# Patient Record
Sex: MALE | Race: WHITE | NOT HISPANIC OR LATINO | Employment: OTHER | URBAN - METROPOLITAN AREA
[De-identification: names, ages, dates, MRNs, and addresses within clinical notes are randomized per-mention and may not be internally consistent; named-entity substitution may affect disease eponyms.]

---

## 2022-03-31 ENCOUNTER — OFFICE VISIT (OUTPATIENT)
Dept: FAMILY MEDICINE CLINIC | Facility: CLINIC | Age: 22
End: 2022-03-31

## 2022-03-31 VITALS
WEIGHT: 128 LBS | OXYGEN SATURATION: 99 % | HEIGHT: 69 IN | RESPIRATION RATE: 18 BRPM | BODY MASS INDEX: 18.96 KG/M2 | HEART RATE: 85 BPM | SYSTOLIC BLOOD PRESSURE: 108 MMHG | DIASTOLIC BLOOD PRESSURE: 70 MMHG

## 2022-03-31 DIAGNOSIS — IMO0001 HORMONAL IMBALANCE IN TRANSGENDER PATIENT: ICD-10-CM

## 2022-03-31 DIAGNOSIS — IMO0001 HORMONAL IMBALANCE IN TRANSGENDER PATIENT: Primary | ICD-10-CM

## 2022-03-31 DIAGNOSIS — F64.0 GENDER DYSPHORIA IN ADULT: ICD-10-CM

## 2022-03-31 PROCEDURE — 99203 OFFICE O/P NEW LOW 30 MIN: CPT | Performed by: FAMILY MEDICINE

## 2022-03-31 RX ORDER — ESTRADIOL VALERATE 20 MG/ML
4 INJECTION INTRAMUSCULAR 2 TIMES WEEKLY
Qty: 5 ML | Refills: 3 | Status: SHIPPED | OUTPATIENT
Start: 2022-03-31 | End: 2022-04-01 | Stop reason: SDUPTHER

## 2022-03-31 RX ORDER — ESTRADIOL VALERATE 20 MG/ML
4 INJECTION INTRAMUSCULAR 2 TIMES WEEKLY
Qty: 5 ML | Refills: 3 | Status: SHIPPED | OUTPATIENT
Start: 2022-03-31 | End: 2022-03-31 | Stop reason: SDUPTHER

## 2022-03-31 NOTE — PATIENT INSTRUCTIONS
1-800-QUIT-NOW    Transfemme Resources    [facility] Transgender Support Group - Facebook group    [employer] for Transgender Health, Standards of Care  https://www.wpath.org/publications/soc - pages 38 and 40 especially     [facility], Transgender Health - [email or web address]/guidelines      [facility] - [email or web address]    [facility] - [email or web address]     Parents, Families, and Friends of Les[place]ians and [place] - https://www.pflag.org/ourtranslovedones  - good document for friends/family who need to learn the basics     [facility]gender [place] - https://transequality.org/documents  - helps with name change, gender marker change, etc, is state specific     * * * * * *    Insurance will typically cover hormones, but if they don't, or if you need to pay out of pocket, use the following site.    www.Trist     1cc, Luer-Marichuy Tip  Syringes      18 G x 1.5” hypodermic needles for drawing up      27 G x 1/2” hypodermic needles for injecting      [facility] Injection Guide -   https://KidStart.org/wp-content/uploads/MG-6_TransHealth_InjectionGuide.pdf     Injection supplies:  Syringes   Needles   Alcohol swabs  Cotton balls/band-aids  Sharps container

## 2022-03-31 NOTE — PROGRESS NOTES
"Chief Complaint  Establish Care ([facility])    Subjective    History of Present Illness {CC  Problem List  Visit  Diagnosis   Encounters  Notes  Medications  Labs  Result Review Imaging  Media :23}     Andrews Rico presents to [facility] for [facility] ([facility]).  History of Present Illness     Here today to establish care and discuss starting gender-affirming hormone therapy. Has always had some awareness that he wasn't truly himself and didn't belong somehow. Once he graduated from high school and was able to \"explore the world a bit\", began to question his identity more. Experienced woodard bars, drag, etc, and found many of those experiences to be freeing and quite educational. Began to realize that he identifies as female and has been doing a lot of self-introspection towards deciding to start hormones. Is now self-sufficient and able to afford a transition, and is stable in his relationships and would like to start. Has been committed to this final end goal for about 5 years now and is very excited to start hormones. Tired of feeling like he's constantly acting as a person others want him to be.     Has done a lot of research into what's involved in transition. Feels that he has a very good idea of what he is getting into. Does not anticipate any difficulties with self administration.    Objective     Vital Signs:   /70   Pulse 85   Resp 18   Ht 175.3 cm (69\")   Wt 58.1 kg (128 lb)   SpO2 99%   BMI 18.90 kg/m²   Physical Exam  Vitals and nursing note reviewed.   Constitutional:       General: He is not in acute distress.     Appearance: Normal appearance. He is not ill-appearing.   Cardiovascular:      Rate and Rhythm: Normal rate and regular rhythm.      Pulses: Normal pulses.      Heart sounds: Normal heart sounds. No murmur heard.  Pulmonary:      Effort: Pulmonary effort is normal. No respiratory distress.      Breath sounds: Normal breath sounds. No rales. "   Neurological:      Mental Status: He is alert and oriented to person, place, and time. Mental status is at baseline.   Psychiatric:         Mood and Affect: Mood normal.         Behavior: Behavior normal.          Result Review  Data Reviewed:{ Labs  Result Review  Imaging  Med Tab  Media :23}                   Assessment and Plan {CC Problem List  Visit Diagnosis  ROS  Review (Popup)  Health Maintenance  Quality  BestPractice  Medications  SmartSets  SnapShot Encounters  Media :23}   Diagnoses and all orders for this visit:    1. Hormonal imbalance in transgender patient (Primary)  -     estradiol valerate (DELESTROGEN) 20 MG/ML injection; Inject 0.2 mL into the appropriate muscle as directed by prescriber 2 (Two) Times a Week.  Dispense: 5 mL; Refill: 3    2. Gender dysphoria in adult  -     estradiol valerate (DELESTROGEN) 20 MG/ML injection; Inject 0.2 mL into the appropriate muscle as directed by prescriber 2 (Two) Times a Week.  Dispense: 5 mL; Refill: 3    Established gender dysphoria, hormonal imbalance in a transgender patient with a strong desire for gender affirming hormone therapy.     Gender identity is consistent, persistent, and insistent. Discussed risks, benefits, alternatives, potential side effects of therapy, and typical follow up. Resources provided including [facility] Standards of Care, PFLAG “Our Trans Loved Ones”, and local support groups.     Meds as above. Gave detailed instructions as to proper subcutaneous injection technique and demonstrated the same. Given a bag of sample injection supplies to last until they are able to obtain their own supply. F/u with a phone call/message in 2-3 weeks, and an office visit in 2 months. Will order labs at f/u. Encouraged to sign up for and use [employer].    >50% of this 30 min visit spent in counseling about gender-affirming hormone therapy.      Follow Up {Instructions Charge Capture  Follow-up Communications :23}     Patient was given  instructions and counseling regarding his condition or for health maintenance advice. Please see specific information pulled into the AVS (placed there by myself) if appropriate.    Return in about 3 months (around 6/30/2022), or if symptoms worsen or fail to improve, for F/u gender-affirming hormone therapy.      AROLDO Zuñiga MD

## 2022-04-01 DIAGNOSIS — E34.9 HORMONAL DISORDER: ICD-10-CM

## 2022-04-01 DIAGNOSIS — F64.0 GENDER DYSPHORIA IN ADULT: ICD-10-CM

## 2022-04-01 RX ORDER — ESTRADIOL VALERATE 20 MG/ML
4 INJECTION INTRAMUSCULAR 2 TIMES WEEKLY
Qty: 5 ML | Refills: 3 | Status: SHIPPED | OUTPATIENT
Start: 2022-04-04

## 2022-04-01 NOTE — TELEPHONE ENCOUNTER
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 Prescription yesterday was sent to the wrong pharmacy. Please refill to updated pharmacy.

## 2022-04-02 ENCOUNTER — NURSE TRIAGE (OUTPATIENT)
Dept: CALL CENTER | Facility: HOSPITAL | Age: 22
End: 2022-04-02

## 2022-04-03 NOTE — TELEPHONE ENCOUNTER
"Caller states mild to moderate itching back of legs only. Caller states started this afternoon. Caller denies any wheezing, hives or facial swelling. Caller denies any rash present. Caller denies any breathing difficulty or difficulty swallowing. Caller states was started on estrogen recently. Caller advised per guideline and will contact PCP on Monday morning if still having issue with itching. Caller will continue to monitor for any symptoms with hives, swallowing or breathing. Caller was advised to follow care advice and monitor for improvement. He was advised to  seek emergent care should he become worse. Advised to call back as needed.           Reason for Disposition  • [1] MODERATE-SEVERE local itching (i.e., interferes with work, school, activities) AND [2] not improved after 24 hours of hydrocortisone cream    Additional Information  • Negative: Athlete's foot suspected (e.g., itchy rash of feet, especially 3rd-4th web spaces)  • Negative: Head lice suspected (e.g., head itching and lice or nits are seen)  • Negative: Insect bites suspected  • Negative: Jock Itch suspected (e.g., itchy rash on upper inner thigh)  • Negative: Poison ivy, oak, or sumac suspected (e.g., itchy rash after contact with poison ivy)  • Negative: Public lice suspected (e.g., genital itching and lice or nits are seen)  • Negative: Ringworm suspected (e.g., round ring-shaped pink patch on skin, scaly border, clearing of the center as the patch grows)  • Negative: [1] Eye itching AND [2] contact with allergic substance  • Negative: [1] Eye itching AND [2] cause is unclear  • Negative: Genital itching - female  • Negative: Genital itching - male  • Negative: Lip itching  • Negative: Rectal (anus) itching  • Negative: Localized rash also present  • Negative: Patient sounds very sick or weak to the triager  • Negative: Looks infected (redness, red streak, pus)    Answer Assessment - Initial Assessment Questions  1. DESCRIPTION: \"Describe " "the itching you are having.\" \"Where is it located?\"      Back of legs   2. SEVERITY: \"How bad is it?\"     - MILD - doesn't interfere with normal activities    - MODERATE-SEVERE: interferes with work, school, sleep, or other activities       Mild and moderate   3. SCRATCHING: \"Are there any scratch marks? Bleeding?\"      Scratching it   4. ONSET: \"When did the itching begin?\"       Today around two   5. CAUSE: \"What do you think is causing the itching?\"       New medication   6. OTHER SYMPTOMS: \"Do you have any other symptoms?\"       Denies   7. PREGNANCY: \"Is there any chance you are pregnant?\" \"When was your last menstrual period?\"    Protocols used: ITCHING - LOCALIZED-ADULT-      "

## 2022-04-04 ENCOUNTER — TELEPHONE (OUTPATIENT)
Dept: FAMILY MEDICINE CLINIC | Facility: CLINIC | Age: 22
End: 2022-04-04

## 2022-04-04 NOTE — TELEPHONE ENCOUNTER
Pt called with concerns about reaction to new Rx. Pt states that they had first injection of estradiol on 3/31/22 and afterward they experienced a lot of itching in both of their legs. Pt states that they have had no other symptoms. Pt is due to take their second dose today 4/4/22 and is not sure if it is safe to keep taking it. Pt requesting a c/b from clinical staff at earliest convenience. Pt states that they will be at work today and it is OK for clinical staff to leave a detail message on their VM with guidance.

## 2022-04-05 ENCOUNTER — TELEPHONE (OUTPATIENT)
Dept: FAMILY MEDICINE CLINIC | Facility: CLINIC | Age: 22
End: 2022-04-05

## 2022-04-05 NOTE — TELEPHONE ENCOUNTER
Pt called and wanted to update us that they gave themselves another injection and have not experienced any side effects. Would like to continue with the medication at this time.

## 2022-06-13 ENCOUNTER — OFFICE VISIT (OUTPATIENT)
Dept: FAMILY MEDICINE CLINIC | Facility: CLINIC | Age: 22
End: 2022-06-13

## 2022-06-13 VITALS
SYSTOLIC BLOOD PRESSURE: 118 MMHG | WEIGHT: 130 LBS | DIASTOLIC BLOOD PRESSURE: 68 MMHG | OXYGEN SATURATION: 98 % | RESPIRATION RATE: 17 BRPM | HEART RATE: 78 BPM | BODY MASS INDEX: 19.2 KG/M2

## 2022-06-13 DIAGNOSIS — F64.0 GENDER DYSPHORIA IN ADULT: Primary | ICD-10-CM

## 2022-06-13 DIAGNOSIS — E34.9 HORMONE IMBALANCE: ICD-10-CM

## 2022-06-13 DIAGNOSIS — Z51.81 THERAPEUTIC DRUG MONITORING: ICD-10-CM

## 2022-06-13 PROCEDURE — 99213 OFFICE O/P EST LOW 20 MIN: CPT | Performed by: FAMILY MEDICINE

## 2022-06-13 NOTE — PROGRESS NOTES
Chief Complaint  Hormonal Imbalance in Transgender Patient     Subjective    History of Present Illness {CC  Problem List  Visit  Diagnosis   Encounters  Notes  Medications  Labs  Result Review Imaging  Media :23}     Andrews Rico presents to [facility]IMARY [facility]ARE for Hormonal Imbalance in Transgender Patient .  History of Present Illness     Here today for follow-up as above. Has been on estradiol for about 3 months now, happy with the results. No problems with self administration, no injection site reactions. Happy with the changes that she is seen so far. No unwanted changes. Has plenty of injection supplies. No need for estradiol refill at this time. Just needs a check of labs.    Objective     Vital Signs:   /68   Pulse 78   Resp 17   Wt 59 kg (130 lb)   SpO2 98%   BMI 19.20 kg/m²   Physical Exam  Vitals and nursing note reviewed.   Constitutional:       General: He is not in acute distress.     Appearance: Normal appearance. He is not ill-appearing.   Cardiovascular:      Rate and Rhythm: Normal rate and regular rhythm.      Pulses: Normal pulses.      Heart sounds: Normal heart sounds. No murmur heard.  Pulmonary:      Effort: Pulmonary effort is normal. No respiratory distress.      Breath sounds: Normal breath sounds. No rales.   Neurological:      Mental Status: He is alert and oriented to person, place, and time. Mental status is at baseline.   Psychiatric:         Mood and Affect: Mood normal.         Behavior: Behavior normal.          Result Review  Data Reviewed:{ Labs  Result Review  Imaging  Med Tab  Media :23}                   Assessment and Plan {CC Problem List  Visit Diagnosis  ROS  Review (Popup)  Health Maintenance  Quality  BestPractice  Medications  SmartSets  SnapShot Encounters  Media :23}   Diagnoses and all orders for this visit:    1. Gender dysphoria in adult (Primary)  -     Estradiol  -     Estrone  -     Testosterone  -      Lipid Panel With LDL / HDL Ratio  -     Comprehensive Metabolic Panel    2. Hormone imbalance  -     Estradiol  -     Estrone  -     Testosterone  -     Lipid Panel With LDL / HDL Ratio  -     Comprehensive Metabolic Panel    3. Therapeutic drug monitoring  -     Estradiol  -     Estrone  -     Testosterone  -     Lipid Panel With LDL / HDL Ratio  -     Comprehensive Metabolic Panel    Orders as above. I will contact her with results as available. We will discuss any changes to her regimen at that time.    Recommended follow-up as below. Encouraged communication via [facility] the meantime.    Patient was given instructions and counseling regarding his condition or for health maintenance advice. Please see specific information pulled into the AVS (placed there by myself) if appropriate.    Return in about 3 months (around 9/13/2022), or if symptoms worsen or fail to improve, for f/u gender-affirming hormone therapy.      AROLDO Zuñiga MD

## 2022-06-14 LAB
ALBUMIN SERPL-MCNC: 4.3 G/DL (ref 4.1–5.2)
ALBUMIN/GLOB SERPL: 2.9 {RATIO} (ref 1.2–2.2)
ALP SERPL-CCNC: 78 IU/L (ref 44–121)
ALT SERPL-CCNC: 15 IU/L (ref 0–44)
AST SERPL-CCNC: 17 IU/L (ref 0–40)
BILIRUB SERPL-MCNC: 0.3 MG/DL (ref 0–1.2)
BUN SERPL-MCNC: 10 MG/DL (ref 6–20)
BUN/CREAT SERPL: 13 (ref 9–20)
CALCIUM SERPL-MCNC: 9 MG/DL (ref 8.7–10.2)
CHLORIDE SERPL-SCNC: 104 MMOL/L (ref 96–106)
CHOLEST SERPL-MCNC: 177 MG/DL (ref 100–199)
CO2 SERPL-SCNC: 22 MMOL/L (ref 20–29)
CREAT SERPL-MCNC: 0.78 MG/DL (ref 0.76–1.27)
EGFRCR SERPLBLD CKD-EPI 2021: 128 ML/MIN/1.73
ESTRADIOL SERPL-MCNC: 425 PG/ML (ref 7.6–42.6)
GLOBULIN SER CALC-MCNC: 1.5 G/DL (ref 1.5–4.5)
GLUCOSE SERPL-MCNC: 71 MG/DL (ref 65–99)
HDLC SERPL-MCNC: 45 MG/DL
LDLC SERPL CALC-MCNC: 111 MG/DL (ref 0–99)
LDLC/HDLC SERPL: 2.5 RATIO (ref 0–3.6)
POTASSIUM SERPL-SCNC: 4.8 MMOL/L (ref 3.5–5.2)
PROT SERPL-MCNC: 5.8 G/DL (ref 6–8.5)
SODIUM SERPL-SCNC: 139 MMOL/L (ref 134–144)
TESTOST SERPL-MCNC: 25 NG/DL (ref 264–916)
TRIGL SERPL-MCNC: 119 MG/DL (ref 0–149)
VLDLC SERPL CALC-MCNC: 21 MG/DL (ref 5–40)

## 2022-06-15 LAB — ESTRONE SERPL-MCNC: 153 PG/ML (ref 0–174)

## 2022-09-28 ENCOUNTER — OFFICE VISIT (OUTPATIENT)
Dept: FAMILY MEDICINE CLINIC | Facility: CLINIC | Age: 22
End: 2022-09-28

## 2022-09-28 VITALS
HEART RATE: 87 BPM | DIASTOLIC BLOOD PRESSURE: 68 MMHG | OXYGEN SATURATION: 99 % | SYSTOLIC BLOOD PRESSURE: 108 MMHG | WEIGHT: 127 LBS | RESPIRATION RATE: 18 BRPM | BODY MASS INDEX: 18.75 KG/M2

## 2022-09-28 DIAGNOSIS — Z51.81 THERAPEUTIC DRUG MONITORING: ICD-10-CM

## 2022-09-28 DIAGNOSIS — Z11.59 ENCOUNTER FOR HEPATITIS C SCREENING TEST FOR LOW RISK PATIENT: ICD-10-CM

## 2022-09-28 DIAGNOSIS — E34.9 HORMONE IMBALANCE: ICD-10-CM

## 2022-09-28 DIAGNOSIS — F64.0 GENDER DYSPHORIA IN ADULT: Primary | ICD-10-CM

## 2022-09-28 DIAGNOSIS — Z23 NEED FOR VACCINATION: ICD-10-CM

## 2022-09-28 PROCEDURE — 91312 COVID-19 (PFIZER) BIVALENT BOOSTER 12+YRS: CPT | Performed by: FAMILY MEDICINE

## 2022-09-28 PROCEDURE — 0124A COVID-19 (PFIZER) BIVALENT BOOSTER 12+YRS: CPT | Performed by: FAMILY MEDICINE

## 2022-09-28 PROCEDURE — 99213 OFFICE O/P EST LOW 20 MIN: CPT | Performed by: FAMILY MEDICINE

## 2022-09-28 NOTE — PROGRESS NOTES
Chief Complaint  hormonal imbalance in transgender patient     Subjective    History of Present Illness {CC  Problem List  Visit  Diagnosis   Encounters  Notes  Medications  Labs  Result Review Imaging  Media :23}     Andrews Rico presents to [facility] for hormonal imbalance in transgender patient .  History of Present Illness     Here today for follow-up as above. Doing quite well on estradiol injections. No problems with injection site reactions, no problems with self administration. Happy with the overall state of their transition at this point. Has noticed a fair amount of breast tenderness and growth, softening of skin, and emotional changes. No unwanted changes at this time. Due for check of labs today.    Also due for a COVID booster, would like to get it today.    Objective     Vital Signs:   /68   Pulse 87   Resp 18   Wt 57.6 kg (127 lb)   SpO2 99%   BMI 18.75 kg/m²   Physical Exam  Vitals and nursing note reviewed.   Constitutional:       General: He is not in acute distress.     Appearance: Normal appearance. He is not ill-appearing.   Cardiovascular:      Rate and Rhythm: Normal rate and regular rhythm.      Pulses: Normal pulses.      Heart sounds: Normal heart sounds. No murmur heard.  Pulmonary:      Effort: Pulmonary effort is normal. No respiratory distress.      Breath sounds: Normal breath sounds. No rales.   Neurological:      Mental Status: He is alert and oriented to person, place, and time. Mental status is at baseline.   Psychiatric:         Mood and Affect: Mood normal.         Behavior: Behavior normal.          Result Review  Data Reviewed:{ Labs  Result Review  Imaging  Med Tab  Media :23}                   Assessment and Plan {CC Problem List  Visit Diagnosis  ROS  Review (Popup)  Health Maintenance  Quality  BestPractice  Medications  SmartSets  SnapShot Encounters  Media :23}   Diagnoses and all orders for this  visit:    1. Gender dysphoria in adult (Primary)  -     Estradiol  -     Estrone  -     Testosterone  -     Lipid Panel With LDL / HDL Ratio  -     Comprehensive Metabolic Panel    2. Hormone imbalance  -     Estradiol  -     Estrone  -     Testosterone  -     Lipid Panel With LDL / HDL Ratio  -     Comprehensive Metabolic Panel    3. Therapeutic drug monitoring  -     Estradiol  -     Estrone  -     Testosterone  -     Lipid Panel With LDL / HDL Ratio  -     Comprehensive Metabolic Panel    4. Need for vaccination  -     COVID-19 Bivalent Booster (Pfizer) 12+yrs    5. Encounter for hepatitis C screening test for low risk patient  -     Hepatitis C Antibody    Orders as above. I will contact them with results as available. Continue regimen as prescribed for now.    Vaccine as requested.    Recommended follow-up as below. Encouraged communication via [employer] in the meantime.    Patient was given instructions and counseling regarding his condition or for health maintenance advice. Please see specific information pulled into the AVS (placed there by myself) if appropriate.    Return in about 6 months (around 3/28/2023), or if symptoms worsen or fail to improve, for f/u gender-affirming hormone therapy.      AROLDO Zuñiga MD

## 2022-09-29 LAB
ALBUMIN SERPL-MCNC: 4.5 G/DL (ref 3.5–5.2)
ALBUMIN/GLOB SERPL: 3.2 G/DL
ALP SERPL-CCNC: 80 U/L (ref 39–117)
ALT SERPL-CCNC: 11 U/L (ref 1–41)
AST SERPL-CCNC: 14 U/L (ref 1–40)
BILIRUB SERPL-MCNC: 0.4 MG/DL (ref 0–1.2)
BUN SERPL-MCNC: 8 MG/DL (ref 6–20)
BUN/CREAT SERPL: 10.8 (ref 7–25)
CALCIUM SERPL-MCNC: 9.3 MG/DL (ref 8.6–10.5)
CHLORIDE SERPL-SCNC: 104 MMOL/L (ref 98–107)
CHOLEST SERPL-MCNC: 184 MG/DL (ref 0–200)
CO2 SERPL-SCNC: 25 MMOL/L (ref 22–29)
CREAT SERPL-MCNC: 0.74 MG/DL (ref 0.76–1.27)
EGFRCR SERPLBLD CKD-EPI 2021: 129.8 ML/MIN/1.73
ESTRADIOL SERPL-MCNC: 446 PG/ML (ref 7.6–42.6)
GLOBULIN SER CALC-MCNC: 1.4 GM/DL
GLUCOSE SERPL-MCNC: 81 MG/DL (ref 65–99)
HCV AB S/CO SERPL IA: <0.1 S/CO RATIO (ref 0–0.9)
HDLC SERPL-MCNC: 42 MG/DL (ref 40–60)
LDLC SERPL CALC-MCNC: 114 MG/DL (ref 0–100)
LDLC/HDLC SERPL: 2.64 {RATIO}
POTASSIUM SERPL-SCNC: 4.2 MMOL/L (ref 3.5–5.2)
PROT SERPL-MCNC: 5.9 G/DL (ref 6–8.5)
SODIUM SERPL-SCNC: 139 MMOL/L (ref 136–145)
TESTOST SERPL-MCNC: 27 NG/DL (ref 264–916)
TRIGL SERPL-MCNC: 155 MG/DL (ref 0–150)
VLDLC SERPL CALC-MCNC: 28 MG/DL (ref 5–40)

## 2022-09-30 LAB — ESTRONE SERPL-MCNC: 200 PG/ML (ref 0–174)

## 2022-11-28 DIAGNOSIS — E34.9 HORMONE IMBALANCE: ICD-10-CM

## 2022-11-28 DIAGNOSIS — F64.0 GENDER DYSPHORIA IN ADULT: Primary | ICD-10-CM

## 2022-11-28 RX ORDER — PROGESTERONE 50 MG/ML
10 INJECTION, SOLUTION INTRAMUSCULAR WEEKLY
Qty: 10 ML | Refills: 1 | Status: SHIPPED | OUTPATIENT
Start: 2022-11-28

## 2023-04-12 ENCOUNTER — OFFICE VISIT (OUTPATIENT)
Dept: FAMILY MEDICINE CLINIC | Facility: CLINIC | Age: 23
End: 2023-04-12
Payer: COMMERCIAL

## 2023-04-12 VITALS
HEART RATE: 80 BPM | OXYGEN SATURATION: 99 % | RESPIRATION RATE: 18 BRPM | SYSTOLIC BLOOD PRESSURE: 116 MMHG | DIASTOLIC BLOOD PRESSURE: 66 MMHG | BODY MASS INDEX: 21.41 KG/M2 | WEIGHT: 145 LBS

## 2023-04-12 DIAGNOSIS — Z51.81 THERAPEUTIC DRUG MONITORING: ICD-10-CM

## 2023-04-12 DIAGNOSIS — F64.0 GENDER DYSPHORIA IN ADULT: Primary | ICD-10-CM

## 2023-04-12 DIAGNOSIS — E34.9 HORMONE IMBALANCE: ICD-10-CM

## 2023-04-12 DIAGNOSIS — R23.8 SKIN IRRITATION: ICD-10-CM

## 2023-04-12 RX ORDER — LIDOCAINE AND PRILOCAINE 25; 25 MG/G; MG/G
CREAM TOPICAL ONCE
Qty: 1 EACH | Refills: 5 | Status: SHIPPED | OUTPATIENT
Start: 2023-04-12 | End: 2023-04-12

## 2023-04-12 RX ORDER — PROGESTERONE 50 MG/ML
10 INJECTION, SOLUTION INTRAMUSCULAR WEEKLY
Qty: 10 ML | Refills: 1 | Status: SHIPPED | OUTPATIENT
Start: 2023-04-12

## 2023-04-12 RX ORDER — ESTRADIOL VALERATE 20 MG/ML
4 INJECTION INTRAMUSCULAR 2 TIMES WEEKLY
Qty: 5 ML | Refills: 3 | Status: SHIPPED | OUTPATIENT
Start: 2023-04-13

## 2023-04-12 NOTE — PROGRESS NOTES
Chief Complaint  Gender dysphoria in adult    Subjective    History of Present Illness {CC  Problem List  Visit  Diagnosis   Encounters  Notes  Medications  Labs  Result Review Imaging  Media :23}     Andrews Rico presents to [facility]IMARY [facility] for Gender dysphoria in adult.  History of Present Illness     Here today for follow-up as above.    Doing well with injections. No problems with self administration, no injection site reactions. Continues on regimen as prescribed. Does need refills today. Overall happy with the changes that have occurred.    Is undergoing laser hair removal, finds it rather painful at times. Wondering about topical therapies.    Due for check of labs today.    Objective     Vital Signs:   /66   Pulse 80   Resp 18   Wt 65.8 kg (145 lb)   SpO2 99%   BMI 21.41 kg/m²   Physical Exam  Vitals and nursing note reviewed.   Constitutional:       General: He is not in acute distress.     Appearance: Normal appearance. He is not ill-appearing.   Cardiovascular:      Rate and Rhythm: Normal rate and regular rhythm.      Pulses: Normal pulses.      Heart sounds: Normal heart sounds. No murmur heard.  Pulmonary:      Effort: Pulmonary effort is normal. No respiratory distress.      Breath sounds: Normal breath sounds. No rales.   Neurological:      Mental Status: He is alert and oriented to person, place, and time. Mental status is at baseline.   Psychiatric:         Mood and Affect: Mood normal.         Behavior: Behavior normal.          Result Review  Data Reviewed:{ Labs  Result Review  Imaging  Med Tab  Media :23}                   Assessment and Plan {CC Problem List  Visit Diagnosis  ROS  Review (Popup)  Health Maintenance  Quality  BestPractice  Medications  SmartSets  SnapShot Encounters  Media :23}   Diagnoses and all orders for this visit:    1. Gender dysphoria in adult (Primary)  -     progesterone oil 50 MG/ML injection; Inject  0.2 mL into the appropriate muscle as directed by prescriber 1 (One) Time Per Week.  Dispense: 10 mL; Refill: 1  -     estradiol valerate (DELESTROGEN) 20 MG/ML injection; Inject 0.2 mL into the appropriate muscle as directed by prescriber 2 (Two) Times a Week.  Dispense: 5 mL; Refill: 3  -     Estradiol  -     Estrone  -     Testosterone  -     Lipid Panel With LDL / HDL Ratio  -     Comprehensive Metabolic Panel    2. Hormone imbalance  -     progesterone oil 50 MG/ML injection; Inject 0.2 mL into the appropriate muscle as directed by prescriber 1 (One) Time Per Week.  Dispense: 10 mL; Refill: 1  -     estradiol valerate (DELESTROGEN) 20 MG/ML injection; Inject 0.2 mL into the appropriate muscle as directed by prescriber 2 (Two) Times a Week.  Dispense: 5 mL; Refill: 3  -     Estradiol  -     Estrone  -     Testosterone  -     Lipid Panel With LDL / HDL Ratio  -     Comprehensive Metabolic Panel    3. Therapeutic drug monitoring  -     Estradiol  -     Estrone  -     Testosterone  -     Lipid Panel With LDL / HDL Ratio  -     Comprehensive Metabolic Panel    4. Skin irritation  -     lidocaine-prilocaine (EMLA) 2.5-2.5 % cream; Apply  topically to the appropriate area as directed 1 (One) Time for 1 dose.  Dispense: 1 each; Refill: 5    Orders as above. I will contact with results as available. Continue regimen as prescribed for now.    Discussed laser hair removal pain control. Prescription for Emla as above.    Recommended follow-up as below. Encouraged communication via MyChart in the meantime    Patient was given instructions and counseling regarding his condition or for health maintenance advice. Please see specific information pulled into the AVS (placed there by myself) if appropriate.    Return in about 6 months (around 10/12/2023), or if symptoms worsen or fail to improve, for Preventive Health Maintenance.      AROLDO Zuñiga MD

## 2023-04-13 LAB
ALBUMIN SERPL-MCNC: 4.5 G/DL (ref 3.5–5.2)
ALBUMIN/GLOB SERPL: 2.1 G/DL
ALP SERPL-CCNC: 73 U/L (ref 39–117)
ALT SERPL-CCNC: 14 U/L (ref 1–41)
AST SERPL-CCNC: 17 U/L (ref 1–40)
BILIRUB SERPL-MCNC: 0.5 MG/DL (ref 0–1.2)
BUN SERPL-MCNC: 11 MG/DL (ref 6–20)
BUN/CREAT SERPL: 13.4 (ref 7–25)
CALCIUM SERPL-MCNC: 9.5 MG/DL (ref 8.6–10.5)
CHLORIDE SERPL-SCNC: 104 MMOL/L (ref 98–107)
CHOLEST SERPL-MCNC: 197 MG/DL (ref 0–200)
CO2 SERPL-SCNC: 25.6 MMOL/L (ref 22–29)
CREAT SERPL-MCNC: 0.82 MG/DL (ref 0.76–1.27)
EGFRCR SERPLBLD CKD-EPI 2021: 125.8 ML/MIN/1.73
ESTRADIOL SERPL-MCNC: 104 PG/ML (ref 7.6–42.6)
ESTRONE SERPL-MCNC: 95 PG/ML (ref 0–174)
GLOBULIN SER CALC-MCNC: 2.1 GM/DL
GLUCOSE SERPL-MCNC: 86 MG/DL (ref 65–99)
HDLC SERPL-MCNC: 54 MG/DL (ref 40–60)
LDLC SERPL CALC-MCNC: 124 MG/DL (ref 0–100)
LDLC/HDLC SERPL: 2.25 {RATIO}
POTASSIUM SERPL-SCNC: 4.2 MMOL/L (ref 3.5–5.2)
PROT SERPL-MCNC: 6.6 G/DL (ref 6–8.5)
SODIUM SERPL-SCNC: 139 MMOL/L (ref 136–145)
TESTOST SERPL-MCNC: 23 NG/DL (ref 264–916)
TRIGL SERPL-MCNC: 107 MG/DL (ref 0–150)
VLDLC SERPL CALC-MCNC: 19 MG/DL (ref 5–40)

## 2023-10-31 DIAGNOSIS — E34.9 HORMONE IMBALANCE: ICD-10-CM

## 2023-10-31 DIAGNOSIS — F64.0 GENDER DYSPHORIA IN ADULT: ICD-10-CM

## 2023-10-31 RX ORDER — PROGESTERONE 50 MG/ML
INJECTION, SOLUTION INTRAMUSCULAR
Qty: 10 ML | Refills: 1 | Status: SHIPPED | OUTPATIENT
Start: 2023-10-31

## 2024-08-16 DIAGNOSIS — F64.0 GENDER DYSPHORIA IN ADULT: ICD-10-CM

## 2024-08-16 DIAGNOSIS — E34.9 HORMONE IMBALANCE: ICD-10-CM

## 2024-08-16 RX ORDER — ESTRADIOL VALERATE 20 MG/ML
4 INJECTION INTRAMUSCULAR 2 TIMES WEEKLY
Qty: 5 ML | Refills: 3 | Status: SHIPPED | OUTPATIENT
Start: 2024-08-19

## 2024-08-16 RX ORDER — PROGESTERONE 50 MG/ML
10 INJECTION, SOLUTION INTRAMUSCULAR WEEKLY
Qty: 10 ML | Refills: 1 | Status: SHIPPED | OUTPATIENT
Start: 2024-08-16